# Patient Record
Sex: FEMALE | Race: WHITE
[De-identification: names, ages, dates, MRNs, and addresses within clinical notes are randomized per-mention and may not be internally consistent; named-entity substitution may affect disease eponyms.]

---

## 2017-09-16 ENCOUNTER — HOSPITAL ENCOUNTER (EMERGENCY)
Dept: HOSPITAL 10 - E/R | Age: 62
Discharge: HOME | End: 2017-09-16
Payer: COMMERCIAL

## 2017-09-16 VITALS
BODY MASS INDEX: 24.44 KG/M2 | HEIGHT: 59 IN | WEIGHT: 121.25 LBS | BODY MASS INDEX: 24.44 KG/M2 | HEIGHT: 59 IN | WEIGHT: 121.25 LBS

## 2017-09-16 VITALS — HEART RATE: 63 BPM | DIASTOLIC BLOOD PRESSURE: 68 MMHG | RESPIRATION RATE: 18 BRPM | SYSTOLIC BLOOD PRESSURE: 105 MMHG

## 2017-09-16 DIAGNOSIS — E11.9: ICD-10-CM

## 2017-09-16 DIAGNOSIS — N39.0: Primary | ICD-10-CM

## 2017-09-16 DIAGNOSIS — R53.83: ICD-10-CM

## 2017-09-16 DIAGNOSIS — I10: ICD-10-CM

## 2017-09-16 DIAGNOSIS — E87.6: ICD-10-CM

## 2017-09-16 DIAGNOSIS — E03.9: ICD-10-CM

## 2017-09-16 DIAGNOSIS — R51: ICD-10-CM

## 2017-09-16 LAB
ADD UMIC: YES
ALBUMIN SERPL-MCNC: 4.4 G/DL (ref 3.3–4.9)
ALBUMIN/GLOB SERPL: 1.46 {RATIO}
ALP SERPL-CCNC: 110 IU/L (ref 42–121)
ALT SERPL-CCNC: 32 IU/L (ref 13–69)
ANION GAP SERPL CALC-SCNC: 10 MMOL/L (ref 8–16)
AST SERPL-CCNC: 27 IU/L (ref 15–46)
BACTERIA #/AREA URNS HPF: (no result) /HPF
BASOPHILS # BLD AUTO: 0 10^3/UL (ref 0–0.1)
BASOPHILS NFR BLD: 0.5 % (ref 0–2)
BILIRUB DIRECT SERPL-MCNC: 0 MG/DL (ref 0–0.2)
BILIRUB SERPL-MCNC: 0.1 MG/DL (ref 0.2–1.3)
BUN SERPL-MCNC: 17 MG/DL (ref 7–20)
CALCIUM SERPL-MCNC: 10.1 MG/DL (ref 8.4–10.2)
CHLORIDE SERPL-SCNC: 104 MMOL/L (ref 97–110)
CO2 SERPL-SCNC: 29 MMOL/L (ref 21–31)
COLOR UR: YELLOW
CREAT SERPL-MCNC: 0.84 MG/DL (ref 0.44–1)
EOSINOPHIL # BLD: 0.3 10^3/UL (ref 0–0.5)
EOSINOPHIL NFR BLD: 4 % (ref 0–7)
ERYTHROCYTE [DISTWIDTH] IN BLOOD BY AUTOMATED COUNT: 13.1 % (ref 11.5–14.5)
GLOBULIN SER-MCNC: 3 G/DL (ref 1.3–3.2)
GLUCOSE SERPL-MCNC: 101 MG/DL (ref 70–220)
GLUCOSE UR STRIP-MCNC: NEGATIVE MG/DL
HCT VFR BLD CALC: 37 % (ref 37–47)
HGB BLD-MCNC: 12.9 G/DL (ref 12–16)
KETONES UR STRIP.AUTO-MCNC: NEGATIVE MG/DL
LYMPHOCYTES # BLD AUTO: 3.1 10^3/UL (ref 0.8–2.9)
LYMPHOCYTES NFR BLD AUTO: 38.5 % (ref 15–51)
MCH RBC QN AUTO: 33.3 PG (ref 29–33)
MCHC RBC AUTO-ENTMCNC: 34.9 G/DL (ref 32–37)
MCV RBC AUTO: 95.6 FL (ref 82–101)
MONOCYTES # BLD: 0.7 10^3/UL (ref 0.3–0.9)
MONOCYTES NFR BLD: 8 % (ref 0–11)
NEUTROPHILS # BLD: 4 10^3/UL (ref 1.6–7.5)
NEUTROPHILS NFR BLD AUTO: 48.8 % (ref 39–77)
NITRITE UR QL STRIP.AUTO: NEGATIVE MG/DL
NRBC # BLD MANUAL: 0 10^3/UL (ref 0–0)
NRBC BLD AUTO-RTO: 0 /100WBC (ref 0–0)
PLATELET # BLD: 185 10^3/UL (ref 140–415)
PMV BLD AUTO: 11.6 FL (ref 7.4–10.4)
POTASSIUM SERPL-SCNC: 3.3 MMOL/L (ref 3.5–5.1)
PROT SERPL-MCNC: 7.4 G/DL (ref 6.1–8.1)
RBC # BLD AUTO: 3.87 10^6/UL (ref 4.2–5.4)
RBC # UR AUTO: NEGATIVE MG/DL
SODIUM SERPL-SCNC: 140 MMOL/L (ref 135–144)
SQUAMOUS #/AREA URNS HPF: (no result) /HPF
UR ASCORBIC ACID: NEGATIVE MG/DL
UR BILIRUBIN (DIP): NEGATIVE MG/DL
UR CLARITY: (no result)
UR PH (DIP): 5 (ref 5–9)
UR RBC: 2 /HPF (ref 0–5)
UR SPECIFIC GRAVITY (DIP): 1.01 (ref 1–1.03)
UR TOTAL PROTEIN (DIP): NEGATIVE MG/DL
UROBILINOGEN UR STRIP-ACNC: NEGATIVE MG/DL
WBC # BLD AUTO: 8.2 10^3/UL (ref 4.8–10.8)
WBC # UR STRIP: (no result) LEU/UL

## 2017-09-16 PROCEDURE — 81001 URINALYSIS AUTO W/SCOPE: CPT

## 2017-09-16 PROCEDURE — 80053 COMPREHEN METABOLIC PANEL: CPT

## 2017-09-16 PROCEDURE — 84443 ASSAY THYROID STIM HORMONE: CPT

## 2017-09-16 PROCEDURE — 71010: CPT

## 2017-09-16 PROCEDURE — 84439 ASSAY OF FREE THYROXINE: CPT

## 2017-09-16 PROCEDURE — 96374 THER/PROPH/DIAG INJ IV PUSH: CPT

## 2017-09-16 PROCEDURE — 82962 GLUCOSE BLOOD TEST: CPT

## 2017-09-16 PROCEDURE — 87086 URINE CULTURE/COLONY COUNT: CPT

## 2017-09-16 PROCEDURE — 85025 COMPLETE CBC W/AUTO DIFF WBC: CPT

## 2017-09-16 PROCEDURE — 93005 ELECTROCARDIOGRAM TRACING: CPT

## 2017-09-16 NOTE — ERD
ER Documentation


Chief Complaint


Date/Time


DATE: 9/16/17 


TIME: 23:29


Chief Complaint


GENERALIZED WEAKNESS X 4 DAYS. NO FEVER, NO N/V , FEELS WEAK





HPI


This is a 61-year-old female with a past medical history of hypertension, 

hypothyroidism, diabetes, peripheral neuropathy, previous vitamin deficiencies 

who is presenting with general fatigue for approximately 1 week.  She denies 

fever or chills.  She denies nausea or vomiting.  She does have a mild general 

headache, which she has chronically.  She denies chest pain or trouble 

breathing.  She denies any abdominal pain.  She denies changes to bowel 

movements.  She had does endorse increased urinary frequency but no pain or 

burning.  She has no focal deficits.  She has no weakness or numbness or 

tingling to the face or extremities.





ROS


All systems reviewed and are negative except as per history of present illness.





Medications


Home Meds


Active Scripts


Cephalexin* (Cephalexin*) 500 Mg Capsule, 500 MG PO BID, #7 CAP


   Prov:NEERU RODRIGUEZ MD         9/16/17


Reported Medications


Amitriptyline Hcl* (Elavil*) 10 Mg Tab, 10 MG PO HS


   6/12/13


Gemfibrozil* (Lopid*) 600 Mg Tablet, 600 MG PO BID


   6/12/13


Citalopram Hydrobromide* (Citalopram Hydrobromide*) 40 Mg Tablet, 40 MG PO DAILY


   6/12/13


Loratadine* (Loratadine*) 10 Mg Tablet, 10 MG PO DAILY


   6/12/13


Levothyroxine Sodium* (Synthroid*) 25 Mcg Tablet, 0.025 MG PO DAILY


   6/12/13


Lisinopril* (Lisinopril*) 10 Mg Tablet, 10 MG PO DAILY


   6/12/13


Gabapentin* (Gabapentin*) 300 Mg Capsule, 300 MG PO TID for 1 Day


   9/12/11


B12/Fa/D3/Calc Cit/Zn Aa Chelt (Rx Balance Int Capsule) 1 Cap Capsule


   12/8/10


Atenolol* (Tenormin*) 50 Mg Tablet


   12/8/10


Triamterene/Hydrochlorothiazid (Triamterene-Hctz 37.5-25 Mg Cp) 1 Cap Capsule


   12/8/10





Allergies


Allergies:  


Coded Allergies:  


     No Known Allergy (Verified , 9/16/17)





PMhx/Soc


History of Surgery:  Yes (cholecycystectomy.hysterectomy)


Anesthesia Reaction:  No


Hx Neurological Disorder:  Yes (fibromyalgia)


Hx Respiratory Disorders:  No


Hx Cardiac Disorders:  Yes (htn)


Hx Psychiatric Problems:  No


Hx Alcohol Use:  No


Hx Substance Use:  No


Hx Tobacco Use:  No


Smoking Status:  Unknown if ever smoked





FmHx


Family History:  diabetes





Physical Exam


Vitals





Vital Signs








  Date Time  Temp Pulse Resp B/P Pulse Ox O2 Delivery O2 Flow Rate FiO2


 


9/16/17 23:54  63 18 105/68 100 Room Air  


 


9/16/17 23:00  63 18 95/56 100 Room Air  


 


9/16/17 21:00  64 19 102/65 100 Room Air  


 


9/16/17 19:35  58 18 137/83 100 Room Air  


 


9/16/17 16:35 98.0 65 20 120/71 100   








Physical Exam


Const:     NAD, Well developed, Well nourished


Head:   Atraumatic 


Eyes:    Normal Conjunctiva


ENT:    Normal External Ears, Nose and Mouth.


Neck:               Full range of motion..~ No meningismus.


Resp:   Clear to auscultation bilaterally


Cardio:   Regular rate and rhythm, no murmurs


Abd:    Soft, non tender, non distended. Normal bowel sounds


Skin:   No petechiae or rashes


Back:   No midline or flank tenderness


Ext:    No cyanosis, or edema


Neur:   Awake and alert, Normal strength and sensation and coordination


Psych:    Normal Mood and Affect


Result Diagram:  


9/16/17 1946 9/16/17 1946





Results 24 hrs





 Laboratory Tests








Test


  9/16/17


19:28 9/16/17


19:46 9/16/17


20:46


 


Urine Color YELLOW   


 


Urine Clarity


  SLIGHTLY


CLOUDY 


  


 


 


Urine pH 5.0   


 


Urine Specific Gravity 1.009   


 


Urine Ketones NEGATIVEmg/dL   


 


Urine Nitrite NEGATIVEmg/dL   


 


Urine Bilirubin NEGATIVEmg/dL   


 


Urine Urobilinogen NEGATIVEmg/dL   


 


Urine Leukocyte Esterase 3+Yusef/ul   


 


Urine Microscopic RBC 2/HPF   


 


Urine Microscopic WBC 34/HPF   


 


Urine Squamous Epithelial


Cells FEW/HPF 


  


  


 


 


Urine Bacteria FEW/HPF   


 


Urine Hemoglobin NEGATIVEmg/dL   


 


Urine Glucose NEGATIVEmg/dL   


 


Urine Total Protein NEGATIVEmg/dl   


 


White Blood Count  8.210^3/ul  


 


Red Blood Count  3.8710^6/ul  


 


Hemoglobin  12.9g/dl  


 


Hematocrit  37.0%  


 


Mean Corpuscular Volume  95.6fl  


 


Mean Corpuscular Hemoglobin  33.3pg  


 


Mean Corpuscular Hemoglobin


Concent 


  34.9g/dl 


  


 


 


Red Cell Distribution Width  13.1%  


 


Platelet Count  91654^3/UL  


 


Mean Platelet Volume  11.6fl  


 


Neutrophils %  48.8%  


 


Lymphocytes %  38.5%  


 


Monocytes %  8.0%  


 


Eosinophils %  4.0%  


 


Basophils %  0.5%  


 


Nucleated Red Blood Cells %  0.0/100WBC  


 


Neutrophils #  4.010^3/ul  


 


Lymphocytes #  3.110^3/ul  


 


Monocytes #  0.710^3/ul  


 


Eosinophils #  0.310^3/ul  


 


Basophils #  0.010^3/ul  


 


Nucleated Red Blood Cells #  0.010^3/ul  


 


Sodium Level  140mmol/L  


 


Potassium Level  3.3mmol/L  


 


Chloride Level  104mmol/L  


 


Carbon Dioxide Level  29mmol/L  


 


Anion Gap  10  


 


Blood Urea Nitrogen  17mg/dl  


 


Creatinine  0.84mg/dl  


 


Glucose Level  101mg/dl  


 


Calcium Level  10.1mg/dl  


 


Total Bilirubin  0.1mg/dl  


 


Direct Bilirubin  0.00mg/dl  


 


Indirect Bilirubin  0.1mg/dl  


 


Aspartate Amino Transf


(AST/SGOT) 


  27IU/L 


  


 


 


Alanine Aminotransferase


(ALT/SGPT) 


  32IU/L 


  


 


 


Alkaline Phosphatase  110IU/L  


 


Total Protein  7.4g/dl  


 


Albumin  4.4g/dl  


 


Globulin  3.00g/dl  


 


Albumin/Globulin Ratio  1.46  


 


Thyroid Stimulating Hormone


(TSH) 


  2.940MIU/L 


  


 


 


Free Thyroxine  1.17ng/dl  


 


Bedside Glucose   81mg/dL 








 Current Medications








 Medications


  (Trade)  Dose


 Ordered  Sig/Arturo


 Route


 PRN Reason  Start Time


 Stop Time Status Last Admin


Dose Admin


 


 Sodium Chloride


  (NS)  1,000 ml @ 


 1,000 mls/hr  Q1H STAT


 IV


   9/16/17 19:35


 9/16/17 20:34 DC 9/16/17 21:05


 


 


 Ketorolac


 Tromethamine


  (Toradol)  15 mg  STK-MED ONCE


 .ROUTE


   9/16/17 21:23


 9/16/17 21:24 DC  


 


 


 Ketorolac


 Tromethamine


  (Toradol)  15 mg  ONCE  ONCE


 IV


   9/16/17 22:33


 9/16/17 22:34 DC 9/16/17 22:39


 


 


 Potassium Chloride


  (Klor-Con 20)  20 meq  ONCE  STAT


 PO


   9/16/17 23:32


 9/16/17 23:33 DC 9/16/17 23:43


 











Procedures/MDM


MDM


The patient's primary complaint is fatigue and intermittent lightheadedness.  A 

presyncope workup will be performed.  Infectious workup will also be completed.

  A cardiac workup will also be done.  With a history of hypothyroidism on 

Synthroid, TSH will also be obtained.  The patient has no focal deficits.  She 

is neurovascularly intact.  I do not suspect a neurologic etiology of her 

symptoms.





Labs


Patient's blood work was obtained and reviewed.  The patient has no 

leukocytosis or left shift.  She is not febrile and I do not suspect a systemic 

infection.  The patient is not anemic.  The patient's CMP demonstrates only a 

very mild hypokalemia at 3.3.  Her renal function and other electrolytes are 

unremarkable.  The patient's TSH is within normal limits.  The patient's urine 

showed 3+ leuk esterase, many WBCs and few bacteria.  There were also a few 

squamous cells, but I am suspicious of a urinary tract infection.





Imaging


CXR IMPRESSION: No evidence for active cardiopulmonary disease. 


Electronically viewed and signed by Physician Dionisio on 09/16/2017 21:16





EKG


EKG read by me: 


Rate/Rhythm: Regular rate and rhythm at a rate of 63


Intervals: Normal


Axis: Normal


Impression: No evidence of ischemia or arrhythmia





Treatment/Disposition


The patient was given IV fluids and Toradol in the emergency department with 

resolution of her mild headache and any lightheadedness.  Her vitals remained 

stable throughout assessment.  The patient was also given potassium in the ER.  

She will be given a prescription for Keflex to treat her urinary tract 

infection.  Urine culture will be sent off for evaluation of sensitivities to 

antibiotics.  The patient was ambulatory in the emergency department without 

difficulty.  At this time, I feel that she is stable for discharge.  She will 

follow-up with her primary care doctor in 1-3 days.  She will be given 

precautions with which to return to the emergency department.





Departure


Diagnosis:  


 Primary Impression:  


 UTI (urinary tract infection)


 Urinary tract infection type:  site unspecified  Hematuria presence:  without 

hematuria  Qualified Code:  N39.0 - Urinary tract infection without hematuria, 

site unspecified


 Additional Impressions:  


 Fatigue


 Fatigue type:  unspecified  Qualified Code:  R53.83 - Fatigue, unspecified type


 Hypokalemia


 Headache


 Headache type:  unspecified  Headache chronicity pattern:  unspecified pattern

  Intractability:  not intractable  Qualified Code:  R51 - Nonintractable 

headache, unspecified chronicity pattern, unspecified headache type


Condition:  Stable











OCTAVIO RODRIGUEZFFREY MD Sep 16, 2017 23:29

## 2017-09-16 NOTE — RADRPT
PROCEDURE:   XR Chest. 

 

CLINICAL INDICATION:   Abdominal pain.

 

TECHNIQUE:   PA and Lateral views of the chest were obtained. 

 

COMPARISON: 07/24/2013.

 

FINDINGS:

The cardiomediastinal silhouette is within normal limits. Improved lung inflation over the interval 
The lungs are clear. No signs of pleural fluid or pneumothorax are seen. The osseous structures and 
soft tissues are unremarkable. 

 

IMPRESSION:

No evidence for active cardiopulmonary disease. 

 

RPTAT: UU

_____________________________________________ 

Physician Dionisio           Date    Time 

Electronically viewed and signed by Physician Dionisio on 09/16/2017 21:16 

 

D:  09/16/2017 21:16  T:  09/16/2017 21:16

RS/

## 2018-08-24 ENCOUNTER — HOSPITAL ENCOUNTER (OUTPATIENT)
Dept: HOSPITAL 91 - SDS | Age: 63
Discharge: HOME | End: 2018-08-24
Payer: COMMERCIAL

## 2018-08-24 ENCOUNTER — HOSPITAL ENCOUNTER (OUTPATIENT)
Age: 63
Discharge: HOME | End: 2018-08-24

## 2018-08-24 DIAGNOSIS — N90.9: Primary | ICD-10-CM

## 2018-08-24 DIAGNOSIS — Z53.9: ICD-10-CM

## 2018-09-18 ENCOUNTER — HOSPITAL ENCOUNTER (OUTPATIENT)
Age: 63
Discharge: HOME | End: 2018-09-18

## 2018-09-18 ENCOUNTER — HOSPITAL ENCOUNTER (OUTPATIENT)
Dept: HOSPITAL 91 - SDS | Age: 63
Discharge: HOME | End: 2018-09-18
Payer: COMMERCIAL

## 2018-09-18 DIAGNOSIS — N90.4: Primary | ICD-10-CM

## 2018-09-18 DIAGNOSIS — D22.5: ICD-10-CM

## 2018-09-18 LAB
ADD MAN DIFF?: NO
ADD UMIC: YES
ALANINE AMINOTRANSFERASE: 26 IU/L (ref 13–69)
ALBUMIN/GLOBULIN RATIO: 1.25
ALBUMIN: 3.9 G/DL (ref 3.3–4.9)
ALKALINE PHOSPHATASE: 79 IU/L (ref 42–121)
ANION GAP: 12 (ref 8–16)
ASPARTATE AMINO TRANSFERASE: 41 IU/L (ref 15–46)
BASOPHIL #: 0 10^3/UL (ref 0–0.1)
BASOPHILS %: 0.6 % (ref 0–2)
BILIRUBIN,DIRECT: 0 MG/DL (ref 0–0.2)
BILIRUBIN,TOTAL: 0.3 MG/DL (ref 0.2–1.3)
BLOOD UREA NITROGEN: 12 MG/DL (ref 7–20)
CALCIUM: 8.8 MG/DL (ref 8.4–10.2)
CARBON DIOXIDE: 24 MMOL/L (ref 21–31)
CHLORIDE: 111 MMOL/L (ref 97–110)
CREATININE: 0.64 MG/DL (ref 0.44–1)
EOSINOPHILS #: 0.3 10^3/UL (ref 0–0.5)
EOSINOPHILS %: 4.4 % (ref 0–7)
GLOBULIN: 3.1 G/DL (ref 1.3–3.2)
GLUCOSE: 104 MG/DL (ref 70–220)
HEMATOCRIT: 36.9 % (ref 37–47)
HEMOGLOBIN: 12.7 G/DL (ref 12–16)
IMMATURE GRANS #M: 0.01 10^3/UL (ref 0–0.03)
IMMATURE GRANS % (M): 0.2 % (ref 0–0.43)
INR: 0.97
LYMPHOCYTES #: 2.6 10^3/UL (ref 0.8–2.9)
LYMPHOCYTES %: 39.7 % (ref 15–51)
MEAN CORPUSCULAR HEMOGLOBIN: 33 PG (ref 29–33)
MEAN CORPUSCULAR HGB CONC: 34.4 G/DL (ref 32–37)
MEAN CORPUSCULAR VOLUME: 95.8 FL (ref 82–101)
MEAN PLATELET VOLUME: 11.5 FL (ref 7.4–10.4)
MONOCYTE #: 0.6 10^3/UL (ref 0.3–0.9)
MONOCYTES %: 9.2 % (ref 0–11)
NEUTROPHIL #: 3 10^3/UL (ref 1.6–7.5)
NEUTROPHILS %: 45.9 % (ref 39–77)
NUCLEATED RED BLOOD CELLS #: 0 10^3/UL (ref 0–0)
NUCLEATED RED BLOOD CELLS%: 0 /100WBC (ref 0–0)
PARTIAL THROMBOPLASTIN TIME: 30.1 SEC (ref 25–35)
PLATELET COUNT: 158 10^3/UL (ref 140–415)
POTASSIUM: 3.8 MMOL/L (ref 3.5–5.1)
PROTIME: 13 SEC (ref 11.9–14.9)
PT RATIO: 1
RED BLOOD COUNT: 3.85 10^6/UL (ref 4.2–5.4)
RED CELL DISTRIBUTION WIDTH: 13.2 % (ref 11.5–14.5)
SODIUM: 143 MMOL/L (ref 135–144)
TOTAL PROTEIN: 7 G/DL (ref 6.1–8.1)
UR ASCORBIC ACID: 20 MG/DL
UR BILIRUBIN (DIP): NEGATIVE MG/DL
UR BLOOD (DIP): NEGATIVE MG/DL
UR CLARITY: CLEAR
UR COLOR: YELLOW
UR GLUCOSE (DIP): NEGATIVE MG/DL
UR KETONES (DIP): NEGATIVE MG/DL
UR LEUKOCYTE ESTERASE (DIP): (no result) LEU/UL
UR MUCUS: (no result) /HPF
UR NITRITE (DIP): NEGATIVE MG/DL
UR PH (DIP): 6 (ref 5–9)
UR RBC: 1 /HPF (ref 0–5)
UR SPECIFIC GRAVITY (DIP): 1.01 (ref 1–1.03)
UR SQUAMOUS EPITHELIAL CELL: (no result) /HPF
UR TOTAL PROTEIN (DIP): NEGATIVE MG/DL
UR UROBILINOGEN (DIP): NEGATIVE MG/DL
UR WBC: 1 /HPF (ref 0–5)
WHITE BLOOD COUNT: 6.5 10^3/UL (ref 4.8–10.8)

## 2018-09-18 PROCEDURE — 88305 TISSUE EXAM BY PATHOLOGIST: CPT

## 2018-09-18 PROCEDURE — 85730 THROMBOPLASTIN TIME PARTIAL: CPT

## 2018-09-18 PROCEDURE — 80053 COMPREHEN METABOLIC PANEL: CPT

## 2018-09-18 PROCEDURE — 11402 EXC TR-EXT B9+MARG 1.1-2 CM: CPT

## 2018-09-18 PROCEDURE — 81001 URINALYSIS AUTO W/SCOPE: CPT

## 2018-09-18 PROCEDURE — 85025 COMPLETE CBC W/AUTO DIFF WBC: CPT

## 2018-09-18 PROCEDURE — 85610 PROTHROMBIN TIME: CPT

## 2018-11-26 ENCOUNTER — HOSPITAL ENCOUNTER (OUTPATIENT)
Dept: HOSPITAL 54 - MSC | Age: 63
Discharge: HOME | End: 2018-11-26
Attending: INTERNAL MEDICINE
Payer: COMMERCIAL

## 2018-11-26 VITALS — SYSTOLIC BLOOD PRESSURE: 141 MMHG | DIASTOLIC BLOOD PRESSURE: 84 MMHG

## 2018-11-26 DIAGNOSIS — Z90.89: ICD-10-CM

## 2018-11-26 DIAGNOSIS — M79.7: ICD-10-CM

## 2018-11-26 DIAGNOSIS — E88.89: ICD-10-CM

## 2018-11-26 DIAGNOSIS — Z85.42: ICD-10-CM

## 2018-11-26 DIAGNOSIS — R74.0: ICD-10-CM

## 2018-11-26 DIAGNOSIS — Z09: Primary | ICD-10-CM
